# Patient Record
Sex: MALE | Race: BLACK OR AFRICAN AMERICAN | NOT HISPANIC OR LATINO | Employment: FULL TIME | URBAN - METROPOLITAN AREA
[De-identification: names, ages, dates, MRNs, and addresses within clinical notes are randomized per-mention and may not be internally consistent; named-entity substitution may affect disease eponyms.]

---

## 2017-08-11 ENCOUNTER — OFFICE VISIT (OUTPATIENT)
Dept: GASTROENTEROLOGY | Facility: CLINIC | Age: 47
End: 2017-08-11
Payer: COMMERCIAL

## 2017-08-11 ENCOUNTER — TELEPHONE (OUTPATIENT)
Dept: ENDOSCOPY | Facility: HOSPITAL | Age: 47
End: 2017-08-11

## 2017-08-11 VITALS — WEIGHT: 227.31 LBS | HEIGHT: 65 IN | BODY MASS INDEX: 37.87 KG/M2

## 2017-08-11 DIAGNOSIS — Z12.11 SPECIAL SCREENING FOR MALIGNANT NEOPLASMS, COLON: Primary | ICD-10-CM

## 2017-08-11 DIAGNOSIS — Z12.11 COLON CANCER SCREENING: ICD-10-CM

## 2017-08-11 DIAGNOSIS — R19.05 PERIUMBILICAL MASS: Primary | ICD-10-CM

## 2017-08-11 PROCEDURE — 99204 OFFICE O/P NEW MOD 45 MIN: CPT | Mod: S$GLB,,, | Performed by: NURSE PRACTITIONER

## 2017-08-11 PROCEDURE — 3008F BODY MASS INDEX DOCD: CPT | Mod: S$GLB,,, | Performed by: NURSE PRACTITIONER

## 2017-08-11 PROCEDURE — 99999 PR PBB SHADOW E&M-EST. PATIENT-LVL III: CPT | Mod: PBBFAC,,, | Performed by: NURSE PRACTITIONER

## 2017-08-11 RX ORDER — POLYETHYLENE GLYCOL 3350, SODIUM SULFATE ANHYDROUS, SODIUM BICARBONATE, SODIUM CHLORIDE, POTASSIUM CHLORIDE 236; 22.74; 6.74; 5.86; 2.97 G/4L; G/4L; G/4L; G/4L; G/4L
4 POWDER, FOR SOLUTION ORAL ONCE
Qty: 4000 ML | Refills: 0 | Status: SHIPPED | OUTPATIENT
Start: 2017-08-11 | End: 2017-08-11

## 2017-08-11 NOTE — PROGRESS NOTES
" [facility] Note    Reason for Visit:  The primary encounter diagnosis was Periumbilical mass- above umbilicus. A diagnosis of Colon cancer screening was also pertinent to this visit.    PCP:   Primary Doctor No       Referring MD:  Tomasz Self  No address on file    HPI:  This is a 46 y.o. male here for evaluation of abdominal mass. Mr. Hooks is new to the clinic.     Pt reports picked up 150 pound child and noticied "knot" above umbilicus a couple weeks ago. When first occurred was painful when pressed on upper abdomen and had nausea. Abdominal pain and nausea subsided shortly after. Denies vomiting. The mass will appear upon changing positions. Denies abdominal pain, discomfort, dullness, or pressure.    Denies hx of reflux, dysphagia/odynophagia.     Has a normal formed stool daily. Denies blood in stool and black tarry. NSAID usage- occasional use.     ROS:  Constitutional: No fevers, no chills, No unintentional weight loss, no fatigue   ENT: No allergies  CV: No chest pain, no palpitations, no perif. edema  Pulm: No cough, No shortness of breath, no wheezes, no sputum  Ophtho: No vision changes  GI: see HPI; also no nausea, no vomiting, no change in appetite.   Derm: No rash  Heme: No lymphadenopathy, No bruising  MSK: No arthritis, no muscle pain, no muscle weakness  : No dysuria, No hematuria  Endo: No hot or cold intolerance  Neuro: No syncope, No seizure     Medical History:  has no past medical history on file.    Surgical History:  has a past surgical history that includes Knee arthroscopy (Bilateral).    Family History: family history is not on file..     Social History:  reports that he has never smoked. He has never used smokeless tobacco. He reports that he drinks alcohol. He reports that he does not use drugs.    Review of patient's allergies indicates:  No Known Allergies    Current Outpatient Prescriptions   Medication Sig    polyethylene glycol " "(REN,KARRIE) 236-22.74-6.74 -5.86 gram suspension Take 4,000 mLs (4 L total) by mouth once. Pt waiting     No current facility-administered medications for this visit.      Objective Findings:    Vital Signs:  Ht 5' 5" (1.651 m)   Wt 103.1 kg (227 lb 4.7 oz)   BMI 37.82 kg/m²   Body mass index is 37.82 kg/m².    Physical Exam:  General Appearance: Well appearing in no acute distress  Head: Normocephalic, without obvious abnormality  Eyes: No scleral icterus, EOMI  ENT: Neck supple, Lips, mucosa, and tongue normal; teeth and gums normal  Lungs: CTA bilaterally in anterior and posterior fields, no wheezes, no crackles.  Heart: Regular rate and rhythm, no murmurs heard  Abdomen: Soft, non tender, with positive bowel sounds in all four quadrants. Midline mass above the umbilicus present upon tightening of abdominal wall.   Extremities: No clubbing, cyanosis or edema  Skin: No rash to exposed areas  Neurologic: AAOx4    Labs:  No results found for: WBC, HGB, HCT, PLT, CHOL, TRIG, HDL, LDLDIRECT, ALT, AST, NA, K, CL, CREATININE, BUN, CO2, TSH, PSA, INR, GLUF, HGBA1C, MICROALBUR    Endoscopy:    EGD- none  Colonoscopy- none    Assessment:  1. Periumbilical mass- above umbilicus    2. Colon cancer screening      Recommendations:  1. Will get Abd CT to rule out hernia.   2. Schedule colonoscopy to rule out malignancies.     Follow up after colonoscopy.     Order summary:  Orders Placed This Encounter    CT Abdomen Pelvis W Wo Contrast    Case request GI: COLONOSCOPY     Thank you so much for allowing me to participate in the care of [facility]    Hoa Brito, APRN, FNP-C    "

## 2017-09-01 ENCOUNTER — ANESTHESIA (OUTPATIENT)
Dept: ENDOSCOPY | Facility: HOSPITAL | Age: 47
End: 2017-09-01
Payer: COMMERCIAL

## 2017-09-01 ENCOUNTER — HOSPITAL ENCOUNTER (OUTPATIENT)
Facility: HOSPITAL | Age: 47
Discharge: HOME | End: 2017-09-01
Attending: INTERNAL MEDICINE | Admitting: INTERNAL MEDICINE
Payer: COMMERCIAL

## 2017-09-01 ENCOUNTER — ANESTHESIA EVENT (OUTPATIENT)
Dept: ENDOSCOPY | Facility: HOSPITAL | Age: 47
End: 2017-09-01
Payer: COMMERCIAL

## 2017-09-01 ENCOUNTER — SURGERY (OUTPATIENT)
Age: 47
End: 2017-09-01

## 2017-09-01 VITALS
HEART RATE: 72 BPM | SYSTOLIC BLOOD PRESSURE: 111 MMHG | HEIGHT: 65 IN | DIASTOLIC BLOOD PRESSURE: 71 MMHG | WEIGHT: 220 LBS | TEMPERATURE: 98 F | BODY MASS INDEX: 36.65 KG/M2 | OXYGEN SATURATION: 97 % | RESPIRATION RATE: 18 BRPM

## 2017-09-01 DIAGNOSIS — Z12.11 SCREENING FOR COLON CANCER: ICD-10-CM

## 2017-09-01 DIAGNOSIS — Z12.11 COLON CANCER SCREENING: Primary | ICD-10-CM

## 2017-09-01 PROCEDURE — 37000008 HC ANESTHESIA 1ST 15 MINUTES: Performed by: INTERNAL MEDICINE

## 2017-09-01 PROCEDURE — G0121 COLON CA SCRN NOT HI RSK IND: HCPCS | Mod: ,,, | Performed by: INTERNAL MEDICINE

## 2017-09-01 PROCEDURE — 25000003 PHARM REV CODE 250: Performed by: INTERNAL MEDICINE

## 2017-09-01 PROCEDURE — 63600175 PHARM REV CODE 636 W HCPCS: Performed by: NURSE ANESTHETIST, CERTIFIED REGISTERED

## 2017-09-01 PROCEDURE — G0121 COLON CA SCRN NOT HI RSK IND: HCPCS | Performed by: INTERNAL MEDICINE

## 2017-09-01 PROCEDURE — D9220A PRA ANESTHESIA: Mod: 33,CRNA,, | Performed by: NURSE ANESTHETIST, CERTIFIED REGISTERED

## 2017-09-01 PROCEDURE — D9220A PRA ANESTHESIA: Mod: 33,ANES,, | Performed by: ANESTHESIOLOGY

## 2017-09-01 PROCEDURE — 37000009 HC ANESTHESIA EA ADD 15 MINS: Performed by: INTERNAL MEDICINE

## 2017-09-01 RX ORDER — PROPOFOL 10 MG/ML
VIAL (ML) INTRAVENOUS CONTINUOUS PRN
Status: DISCONTINUED | OUTPATIENT
Start: 2017-09-01 | End: 2017-09-01

## 2017-09-01 RX ORDER — SODIUM CHLORIDE 9 MG/ML
INJECTION, SOLUTION INTRAVENOUS CONTINUOUS
Status: DISCONTINUED | OUTPATIENT
Start: 2017-09-01 | End: 2017-09-01 | Stop reason: HOSPADM

## 2017-09-01 RX ORDER — LIDOCAINE HCL/PF 100 MG/5ML
SYRINGE (ML) INTRAVENOUS
Status: DISCONTINUED | OUTPATIENT
Start: 2017-09-01 | End: 2017-09-01

## 2017-09-01 RX ADMIN — SODIUM CHLORIDE: 0.9 INJECTION, SOLUTION INTRAVENOUS at 10:09

## 2017-09-01 RX ADMIN — LIDOCAINE HYDROCHLORIDE 100 MG: 20 INJECTION, SOLUTION INTRAVENOUS at 10:09

## 2017-09-01 RX ADMIN — PROPOFOL 150 MCG/KG/MIN: 10 INJECTION, EMULSION INTRAVENOUS at 10:09

## 2017-09-01 NOTE — PATIENT INSTRUCTIONS
Discharge Summary/Instructions after an Endoscopic Procedure  Patient Name: Don Hooks  Patient MRN: [id number]  Patient YOB: 1970 Friday, September 01, 2017  Raymon Haq MD  RESTRICTIONS:  During your procedure today, you received medications for sedation.  These   medications may affect your judgment, balance and coordination.  Therefore,   for 24 hours, you have the following restrictions:   - DO NOT drive a car, operate machinery, make legal/financial decisions,   sign important papers or drink alcohol.    ACTIVITY:  The following day: return to full activity including work, except no heavy   lifting, straining or running for 3 days if polyps were removed.  DIET:  Eat and drink normally unless instructed otherwise.  TREATMENT FOR COMMON SIDE EFFECTS:  - Mild abdominal pain, belching, bloating or excessive gas: rest, eat   lightly and use a heating pad.  - Sore Throat: treat with throat lozenges and/or gargle with warm salt   water.  SYMPTOMS TO WATCH FOR AND REPORT TO YOUR PHYSICIAN:  1. Abdominal pain or bloating, other than gas cramps.  2. Chest pain.  3. Back pain.  4. Chills or fever occurring within 24 hours after the procedure.  5. Rectal bleeding, which would show as bright red, maroon, or black stools.   (A tablespoon of blood from the rectum is not serious, especially if   hemorrhoids are present.)  6. Vomiting.  7. Weakness or dizziness.  8. Because air was used during the procedure, expelling large amounts of air   from your rectum or belching is normal.  9. If a bowel prep was taken, you may not have a bowel movement for 1-3   days.  This is normal.  GO DIRECTLY TO THE EMERGENCY ROOM IF YOU HAVE ANY OF THE FOLLOWING:   Difficulty breathing   Chills and/or fever over 101 F   Persistent vomiting and/or vomiting blood   Severe abdominal pain   Severe chest pain   Black, tarry stools   Bleeding- more than one tablespoon  Your doctor recommends these additional instructions:  If  any biopsies were taken, your doctors clinic will call you in 1 to 2   weeks with any results.  You have a contact number available for emergencies.  The signs and symptoms   of potential delayed complications were discussed with you.  You may return   to normal activities tomorrow.  Written discharge instructions were   provided to you.   You are being discharged to home.   Resume your previous diet.   Continue your present medications.   Your physician has recommended a repeat colonoscopy in 10 years for   screening purposes.  For questions, problems or results please call your physician - Raymon Haq MD at Work:  [phone].  [facility], EMERGENCY ROOM PHONE NUMBER: [phone]  IF A COMPLICATION OR EMERGENCY SITUATION ARISES AND YOU ARE UNABLE TO REACH   YOUR PHYSICIAN - GO DIRECTLY TO THE EMERGENCY ROOM.  Raymon Haq MD  9/1/2017 11:23:21 AM  This report has been verified and signed electronically.

## 2017-09-01 NOTE — INTERVAL H&P NOTE
The patient has been examined and the H&P has been reviewed:    There is no interval changes since last encounter.    Colonoscopy: Screening for CRC  Sedation: GA  ASA: Per anesthesia  Mallampati: Per anesthesia    Endoscopy risks, benefits and alternative options discussed and understood by patient/family.          Active Hospital Problems    Diagnosis  POA    Screening for colon cancer [Z12.11]  Not Applicable      Resolved Hospital Problems    Diagnosis Date Resolved POA   No resolved problems to display.

## 2017-09-01 NOTE — H&P (VIEW-ONLY)
" [facility] Note    Reason for Visit:  The primary encounter diagnosis was Periumbilical mass- above umbilicus. A diagnosis of Colon cancer screening was also pertinent to this visit.    PCP:   Primary Doctor No       Referring MD:  Tomasz Self  No address on file    HPI:  This is a 46 y.o. male here for evaluation of abdominal mass. Mr. Hooks is new to the clinic.     Pt reports picked up 150 pound child and noticied "knot" above umbilicus a couple weeks ago. When first occurred was painful when pressed on upper abdomen and had nausea. Abdominal pain and nausea subsided shortly after. Denies vomiting. The mass will appear upon changing positions. Denies abdominal pain, discomfort, dullness, or pressure.    Denies hx of reflux, dysphagia/odynophagia.     Has a normal formed stool daily. Denies blood in stool and black tarry. NSAID usage- occasional use.     ROS:  Constitutional: No fevers, no chills, No unintentional weight loss, no fatigue   ENT: No allergies  CV: No chest pain, no palpitations, no perif. edema  Pulm: No cough, No shortness of breath, no wheezes, no sputum  Ophtho: No vision changes  GI: see HPI; also no nausea, no vomiting, no change in appetite.   Derm: No rash  Heme: No lymphadenopathy, No bruising  MSK: No arthritis, no muscle pain, no muscle weakness  : No dysuria, No hematuria  Endo: No hot or cold intolerance  Neuro: No syncope, No seizure     Medical History:  has no past medical history on file.    Surgical History:  has a past surgical history that includes Knee arthroscopy (Bilateral).    Family History: family history is not on file..     Social History:  reports that he has never smoked. He has never used smokeless tobacco. He reports that he drinks alcohol. He reports that he does not use drugs.    Review of patient's allergies indicates:  No Known Allergies    Current Outpatient Prescriptions   Medication Sig    polyethylene glycol " "(REN,KARRIE) 236-22.74-6.74 -5.86 gram suspension Take 4,000 mLs (4 L total) by mouth once. Pt waiting     No current facility-administered medications for this visit.      Objective Findings:    Vital Signs:  Ht 5' 5" (1.651 m)   Wt 103.1 kg (227 lb 4.7 oz)   BMI 37.82 kg/m²   Body mass index is 37.82 kg/m².    Physical Exam:  General Appearance: Well appearing in no acute distress  Head: Normocephalic, without obvious abnormality  Eyes: No scleral icterus, EOMI  ENT: Neck supple, Lips, mucosa, and tongue normal; teeth and gums normal  Lungs: CTA bilaterally in anterior and posterior fields, no wheezes, no crackles.  Heart: Regular rate and rhythm, no murmurs heard  Abdomen: Soft, non tender, with positive bowel sounds in all four quadrants. Midline mass above the umbilicus present upon tightening of abdominal wall.   Extremities: No clubbing, cyanosis or edema  Skin: No rash to exposed areas  Neurologic: AAOx4    Labs:  No results found for: WBC, HGB, HCT, PLT, CHOL, TRIG, HDL, LDLDIRECT, ALT, AST, NA, K, CL, CREATININE, BUN, CO2, TSH, PSA, INR, GLUF, HGBA1C, MICROALBUR    Endoscopy:    EGD- none  Colonoscopy- none    Assessment:  1. Periumbilical mass- above umbilicus    2. Colon cancer screening      Recommendations:  1. Will get Abd CT to rule out hernia.   2. Schedule colonoscopy to rule out malignancies.     Follow up after colonoscopy.     Order summary:  Orders Placed This Encounter    CT Abdomen Pelvis W Wo Contrast    Case request GI: COLONOSCOPY     Thank you so much for allowing me to participate in the care of [facility]    Hoa Brito, APRN, FNP-C    "

## 2017-09-01 NOTE — ANESTHESIA POSTPROCEDURE EVALUATION
"Anesthesia Post Evaluation    Patient: Don Hooks    Procedure(s) Performed: Procedure(s) (LRB):  COLONOSCOPY (N/A)    Final Anesthesia Type: general  Patient location during evaluation: PACU  Patient participation: Yes- Able to Participate  Level of consciousness: awake and alert  Post-procedure vital signs: reviewed and stable  Pain management: adequate  Airway patency: patent  PONV status at discharge: No PONV  Anesthetic complications: no      Cardiovascular status: blood pressure returned to baseline  Respiratory status: unassisted  Hydration status: euvolemic  Follow-up not needed.        Visit Vitals  /71 (BP Location: Left arm, Patient Position: Lying)   Pulse 72   Temp 36.8 °C (98.2 °F) (Temporal)   Resp 18   Ht 5' 5" (1.651 m)   Wt 99.8 kg (220 lb)   SpO2 97%   BMI 36.61 kg/m²       Pain/Chivo Score: Pain Assessment Performed: Yes (9/1/2017 11:47 AM)  Presence of Pain: denies (9/1/2017 11:47 AM)  Chivo Score: 9 (9/1/2017 11:37 AM)      "

## 2017-09-01 NOTE — TRANSFER OF CARE
"Anesthesia Transfer of Care Note    Patient: Don Hooks    Procedure(s) Performed: Procedure(s) (LRB):  COLONOSCOPY (N/A)    Patient location: [facility]    Anesthesia Type: general    Transport from OR: Transported from OR on 2-3 L/min O2 by NC with adequate spontaneous ventilation    Post pain: adequate analgesia    Post assessment: no apparent anesthetic complications    Post vital signs: stable    Level of consciousness: awake    Nausea/Vomiting: no nausea/vomiting    Complications: none    Transfer of care protocol was followed      Last vitals:   Visit Vitals  BP (!) 144/78 (Patient Position: Lying)   Pulse 74   Temp 37 °C (98.6 °F) (Temporal)   Resp 16   Ht 5' 5" (1.651 m)   Wt 99.8 kg (220 lb)   SpO2 97%   BMI 36.61 kg/m²     "

## 2017-09-01 NOTE — ANESTHESIA PREPROCEDURE EVALUATION
09/01/2017  Don Hooks is a 46 y.o., male.    Anesthesia Evaluation    I have reviewed the Patient Summary Reports.     I have reviewed the Medications.     Review of Systems  Anesthesia Hx:  No problems with previous Anesthesia  History of prior surgery of interest to airway management or planning: Previous anesthesia: General   Social:  Social Alcohol Use, Non-Smoker    Hematology/Oncology:  Hematology Normal   Oncology Normal     EENT/Dental:EENT/Dental Normal   Cardiovascular:  Cardiovascular Normal Exercise tolerance: good     Pulmonary:  Pulmonary Normal    Renal/:  Renal/ Normal     Hepatic/GI:  Hepatic/GI Normal    Musculoskeletal:  Musculoskeletal Normal    Neurological:  Neurology Normal    Endocrine:  Endocrine Normal    Dermatological:  Skin Normal    Psych:  Psychiatric Normal           Physical Exam  General:  Obesity    Airway/Jaw/Neck:  Airway Findings: Mouth Opening: Normal Tongue: Normal  General Airway Assessment: Adult  Mallampati: II  TM Distance: Normal, at least 6 cm  Jaw/Neck Findings:  Neck ROM: Normal ROM      Dental:  Dental Findings: In tact        Mental Status:  Mental Status Findings:  Cooperative, Alert and Oriented         Anesthesia Plan  Type of Anesthesia, risks & benefits discussed:  Anesthesia Type:  general  Patient's Preference: GA  Intra-op Monitoring Plan: standard ASA monitors  Intra-op Monitoring Plan Comments:   Post Op Pain Control Plan:   Post Op Pain Control Plan Comments:   Induction:   IV  Beta Blocker:  Patient is not currently on a Beta-Blocker (No further documentation required).       Informed Consent: Patient understands risks and agrees with Anesthesia plan.  Questions answered. Anesthesia consent signed with patient.  ASA Score: 2     Day of Surgery Review of History & Physical:    H&P update referred to the provider.         Ready For  Surgery From Anesthesia Perspective.

## 2017-09-08 ENCOUNTER — TELEPHONE (OUTPATIENT)
Dept: ENDOSCOPY | Facility: HOSPITAL | Age: 47
End: 2017-09-08

## 2023-02-24 ENCOUNTER — HOSPITAL ENCOUNTER (OUTPATIENT)
Dept: RADIOLOGY | Facility: HOSPITAL | Age: 53
Discharge: HOME | End: 2023-02-24
Attending: PHYSICIAN ASSISTANT
Payer: COMMERCIAL

## 2023-02-24 ENCOUNTER — OFFICE VISIT (OUTPATIENT)
Dept: SPORTS MEDICINE | Facility: CLINIC | Age: 53
End: 2023-02-24
Payer: COMMERCIAL

## 2023-02-24 VITALS
HEIGHT: 65 IN | BODY MASS INDEX: 36.65 KG/M2 | DIASTOLIC BLOOD PRESSURE: 83 MMHG | WEIGHT: 220 LBS | SYSTOLIC BLOOD PRESSURE: 151 MMHG | HEART RATE: 87 BPM

## 2023-02-24 DIAGNOSIS — G89.29 CHRONIC PAIN OF BOTH KNEES: ICD-10-CM

## 2023-02-24 DIAGNOSIS — M25.561 CHRONIC PAIN OF BOTH KNEES: ICD-10-CM

## 2023-02-24 DIAGNOSIS — M25.562 CHRONIC PAIN OF BOTH KNEES: ICD-10-CM

## 2023-02-24 DIAGNOSIS — M17.0 PRIMARY OSTEOARTHRITIS OF BOTH KNEES: Primary | ICD-10-CM

## 2023-02-24 PROCEDURE — 3077F SYST BP >= 140 MM HG: CPT | Mod: CPTII,S$GLB,, | Performed by: PHYSICIAN ASSISTANT

## 2023-02-24 PROCEDURE — 20610 PR DRAIN/INJECT LARGE JOINT/BURSA: ICD-10-PCS | Mod: 50,S$GLB,, | Performed by: PHYSICIAN ASSISTANT

## 2023-02-24 PROCEDURE — 1160F PR REVIEW ALL MEDS BY PRESCRIBER/CLIN PHARMACIST DOCUMENTED: ICD-10-PCS | Mod: CPTII,S$GLB,, | Performed by: PHYSICIAN ASSISTANT

## 2023-02-24 PROCEDURE — 20610 DRAIN/INJ JOINT/BURSA W/O US: CPT | Mod: 50,S$GLB,, | Performed by: PHYSICIAN ASSISTANT

## 2023-02-24 PROCEDURE — 3008F PR BODY MASS INDEX (BMI) DOCUMENTED: ICD-10-PCS | Mod: CPTII,S$GLB,, | Performed by: PHYSICIAN ASSISTANT

## 2023-02-24 PROCEDURE — 3079F PR MOST RECENT DIASTOLIC BLOOD PRESSURE 80-89 MM HG: ICD-10-PCS | Mod: CPTII,S$GLB,, | Performed by: PHYSICIAN ASSISTANT

## 2023-02-24 PROCEDURE — 1160F RVW MEDS BY RX/DR IN RCRD: CPT | Mod: CPTII,S$GLB,, | Performed by: PHYSICIAN ASSISTANT

## 2023-02-24 PROCEDURE — 3079F DIAST BP 80-89 MM HG: CPT | Mod: CPTII,S$GLB,, | Performed by: PHYSICIAN ASSISTANT

## 2023-02-24 PROCEDURE — 3008F BODY MASS INDEX DOCD: CPT | Mod: CPTII,S$GLB,, | Performed by: PHYSICIAN ASSISTANT

## 2023-02-24 PROCEDURE — 73564 X-RAY EXAM KNEE 4 OR MORE: CPT | Mod: 26,50,, | Performed by: RADIOLOGY

## 2023-02-24 PROCEDURE — 1159F MED LIST DOCD IN RCRD: CPT | Mod: CPTII,S$GLB,, | Performed by: PHYSICIAN ASSISTANT

## 2023-02-24 PROCEDURE — 3077F PR MOST RECENT SYSTOLIC BLOOD PRESSURE >= 140 MM HG: ICD-10-PCS | Mod: CPTII,S$GLB,, | Performed by: PHYSICIAN ASSISTANT

## 2023-02-24 PROCEDURE — 99999 PR PBB SHADOW E&M-NEW PATIENT-LVL III: ICD-10-PCS | Mod: PBBFAC,,, | Performed by: PHYSICIAN ASSISTANT

## 2023-02-24 PROCEDURE — 99204 OFFICE O/P NEW MOD 45 MIN: CPT | Mod: 25,S$GLB,, | Performed by: PHYSICIAN ASSISTANT

## 2023-02-24 PROCEDURE — 99999 PR PBB SHADOW E&M-NEW PATIENT-LVL III: CPT | Mod: PBBFAC,,, | Performed by: PHYSICIAN ASSISTANT

## 2023-02-24 PROCEDURE — 99204 PR OFFICE/OUTPT VISIT, NEW, LEVL IV, 45-59 MIN: ICD-10-PCS | Mod: 25,S$GLB,, | Performed by: PHYSICIAN ASSISTANT

## 2023-02-24 PROCEDURE — 1159F PR MEDICATION LIST DOCUMENTED IN MEDICAL RECORD: ICD-10-PCS | Mod: CPTII,S$GLB,, | Performed by: PHYSICIAN ASSISTANT

## 2023-02-24 PROCEDURE — 73564 X-RAY EXAM KNEE 4 OR MORE: CPT | Mod: TC,50

## 2023-02-24 PROCEDURE — 73564 XR KNEE ORTHO BILAT WITH FLEXION: ICD-10-PCS | Mod: 26,50,, | Performed by: RADIOLOGY

## 2023-02-24 RX ORDER — TRIAMCINOLONE ACETONIDE 40 MG/ML
40 INJECTION, SUSPENSION INTRA-ARTICULAR; INTRAMUSCULAR
Status: COMPLETED | OUTPATIENT
Start: 2023-02-24 | End: 2023-02-24

## 2023-02-24 RX ORDER — MELOXICAM 15 MG/1
15 TABLET ORAL DAILY
Qty: 30 TABLET | Refills: 2 | Status: SHIPPED | OUTPATIENT
Start: 2023-02-24

## 2023-02-24 RX ADMIN — TRIAMCINOLONE ACETONIDE 40 MG: 40 INJECTION, SUSPENSION INTRA-ARTICULAR; INTRAMUSCULAR at 02:02

## 2023-02-24 NOTE — PROGRESS NOTES
Subjective:          Chief Complaint: Don Hooks is a 52 y.o. male who had concerns including Pain of the Right Knee and Pain of the Left Knee.    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 The gradual pain started 20+ years ago with no clear LOURDES and is becoming progressively worse last few months. Pain is located over (points to) medial/lateral joint line and behind the patella bilaterally. He reports that the pain is a 6 /10 aching and throbbing pain today and not responding adequately to conservative measures which have included activity modifications, rest, and oral medication. Is affecting ADLs and limiting desired level of activity. Denies numbness, tingling, radiation in toes, and inability to bear weight.  Pain is 7 /10 at its worst    Mechanical symptoms: none  Subjective instability: (--)   Worse at the end of the day and with increased activity, sit to stand  Better with rest.   Nocturnal symptoms: (+)    No previous trauma on knees    Bilateral knee surgeries 1980s, possible mensicus          Review of Systems   Constitutional: Negative for chills and fever.   HENT:  Negative for congestion and sore throat.    Eyes:  Negative for discharge and double vision.   Cardiovascular:  Negative for chest pain, palpitations and syncope.   Respiratory:  Negative for cough and shortness of breath.    Endocrine: Negative for cold intolerance and heat intolerance.   Skin:  Negative for dry skin and rash.   Musculoskeletal:  Positive for arthritis, joint pain and joint swelling.   Gastrointestinal:  Negative for abdominal pain, nausea and vomiting.   Neurological:  Negative for focal weakness, numbness and paresthesias.                 Objective:        General: Don is well-developed, well-nourished, appears stated age, in no acute distress, alert and oriented to time, place and person.     General    Nursing note and vitals reviewed.  Constitutional: He is  oriented to person, place, and time. He appears well-developed and well-nourished. No distress.   Eyes: Conjunctivae and EOM are normal. Pupils are equal, round, and reactive to light.   Neck: Neck supple. No JVD present.   Cardiovascular:  Normal heart sounds and intact distal pulses.            No murmur heard.  Pulmonary/Chest: Effort normal and breath sounds normal. No respiratory distress.   Abdominal: Soft. Bowel sounds are normal. He exhibits no distension. There is no abdominal tenderness.   Neurological: He is alert and oriented to person, place, and time. Coordination normal.   Psychiatric: He has a normal mood and affect. His behavior is normal. Judgment and thought content normal.     General Musculoskeletal Exam   Gait: normal       Right Knee Exam     Inspection   Erythema: absent  Scars: absent  Swelling: absent  Effusion: absent  Deformity: absent  Bruising: absent    Tenderness   The patient is tender to palpation of the medial joint line and lateral joint line.    Range of Motion   Extension:  0 (3) abnormal   Flexion:  100 abnormal     Tests   Meniscus   Claudio:  Medial - negative Lateral - negative  Ligament Examination   Lachman: normal (-1 to 2mm)   PCL-Posterior Drawer: normal (0 to 2mm)     MCL - Valgus: normal (0 to 2mm)  LCL - Varus: normal  Dial Test at 90 degrees: normal (< 5 degrees)  Posterolateral Corner: stable  Patella   Patellar Glide (quadrants): Lateral - 1   Medial - 2  Patellar Grind: positive    Other   Popliteal (Baker's) Cyst: absent  Sensation: normal    Left Knee Exam     Inspection   Erythema: absent  Scars: absent  Swelling: absent  Effusion: absent  Deformity: absent  Bruising: absent    Tenderness   The patient tender to palpation of the medial joint line and patella.    Range of Motion   Extension:  0 (3) abnormal   Flexion:  100 abnormal     Tests   Meniscus   Claudio:  Medial - negative Lateral - negative  Stability   Lachman: normal (-1 to 2mm)   PCL-Posterior  Drawer: normal (0 to 2mm)  MCL - Valgus: normal (0 to 2mm)  LCL - Varus: normal (0 to 2mm)  Dial Test at 90 degrees: normal (< 5 degrees)  Posterolateral Corner: stable  Patella   Patellar Glide (Quadrants): Lateral - 1 Medial - 2  Patellar Grind: positive    Other   Popliteal (Baker's) Cyst: absent  Sensation: normal    Right Hip Exam     Tests   Angelito: negative  Left Hip Exam     Tests   Angelito: negative          Muscle Strength   Right Lower Extremity   Hip Abduction: 5/5   Quadriceps:  4/5   Hamstrin/5   Left Lower Extremity   Hip Abduction: 5/5   Quadriceps:  4/5   Hamstrin/5     Vascular Exam     Right Pulses  Dorsalis Pedis:      2+  Posterior Tibial:      2+        Left Pulses  Dorsalis Pedis:      2+  Posterior Tibial:      2+        Edema  Right Lower Leg: absent  Left Lower Leg: absent    Radiographic findings today:    DJD with narrowing of the patellofemoral and the tibiofemoral joint spaces slightly more marked the left side.  Ununited ossicle adjacent to the left tibial tuberosity noted.  No acute fracture or dislocation.  No bone destruction identified.    Kellgren-Simba Classification: 3    Xrays of the knees were ordered and reviewed by me today. These findings were discussed and reviewed with the patient.        Assessment:       Encounter Diagnoses   Name Primary?    Primary osteoarthritis of both knees Yes    Chronic pain of both knees           Plan:       We have discussed a variety of treatment options including medications, injections, physical therapy and other alternative treatments. Pt is requesting CSI and physical therapy at this time.    I made the decision to obtain old records of the patient including previous notes and imaging. I independently reviewed and interpreted lab results today as well as prior imaging.     1. Injection Procedure  A time out was performed, including verification of patient ID, procedure, site and side, availability of information and equipment,  review of safety issues, and agreement with consent, the procedure site was marked.    After time out was performed, the patient was prepped aseptically with chloraprep swabsticks. A diagnostic and therapeutic injection of 1:4cc Kenalog/Marcaine was given under sterile technique using a 22g x 1.5 needle from the Superolateral  aspect of the bilateral Knee Joint in the supine position.      Don Hooks had no adverse reactions to the medication. Pain decreased. He was instructed to apply ice to the joint for 20 minutes and avoid strenuous activities for 24-36 hours following the injection. He was warned of possible blood sugar and/or blood pressure changes during that time. Following that time, he can resume regular activities.    He was reminded to call the clinic immediately for any adverse side effects as explained in clinic today.    2. Mobic 15 mg 1 time daily PRN for pain management. Patient understands to take with food and/or OTC prilosec to decrease GI side effects.  3. Ambulatory referral to physical therapy for patellofemoral strength and conditioning protocol  4. Ice compress to the affected area 2-3x a day for 15-20 minutes as needed for pain management.  5. Discussed the possibility of viscosupplementation if pain is refractory to CSI.  6. RTC to see Trae Maradiaga PA-C as needed for follow-up.    All of the patient's questions were answered and the patient will contact us if they have any questions or concerns in the interim.              Patient questionnaires may have been collected.

## 2025-07-21 ENCOUNTER — HOSPITAL ENCOUNTER (OUTPATIENT)
Dept: RADIOLOGY | Facility: HOSPITAL | Age: 55
Discharge: HOME | End: 2025-07-21
Attending: PHYSICIAN ASSISTANT
Payer: COMMERCIAL

## 2025-07-21 ENCOUNTER — OFFICE VISIT (OUTPATIENT)
Dept: SPORTS MEDICINE | Facility: CLINIC | Age: 55
End: 2025-07-21
Payer: COMMERCIAL

## 2025-07-21 VITALS
BODY MASS INDEX: 38.89 KG/M2 | SYSTOLIC BLOOD PRESSURE: 169 MMHG | WEIGHT: 233.69 LBS | DIASTOLIC BLOOD PRESSURE: 89 MMHG | HEART RATE: 101 BPM

## 2025-07-21 DIAGNOSIS — M25.512 LEFT SHOULDER PAIN, UNSPECIFIED CHRONICITY: ICD-10-CM

## 2025-07-21 DIAGNOSIS — M75.102 ROTATOR CUFF SYNDROME OF LEFT SHOULDER: Primary | ICD-10-CM

## 2025-07-21 PROCEDURE — 3077F SYST BP >= 140 MM HG: CPT | Mod: CPTII,S$GLB,, | Performed by: PHYSICIAN ASSISTANT

## 2025-07-21 PROCEDURE — 3079F DIAST BP 80-89 MM HG: CPT | Mod: CPTII,S$GLB,, | Performed by: PHYSICIAN ASSISTANT

## 2025-07-21 PROCEDURE — 1159F MED LIST DOCD IN RCRD: CPT | Mod: CPTII,S$GLB,, | Performed by: PHYSICIAN ASSISTANT

## 2025-07-21 PROCEDURE — 99214 OFFICE O/P EST MOD 30 MIN: CPT | Mod: S$GLB,,, | Performed by: PHYSICIAN ASSISTANT

## 2025-07-21 PROCEDURE — 99999 PR PBB SHADOW E&M-EST. PATIENT-LVL III: CPT | Mod: PBBFAC,,, | Performed by: PHYSICIAN ASSISTANT

## 2025-07-21 PROCEDURE — 3008F BODY MASS INDEX DOCD: CPT | Mod: CPTII,S$GLB,, | Performed by: PHYSICIAN ASSISTANT

## 2025-07-21 PROCEDURE — 73030 X-RAY EXAM OF SHOULDER: CPT | Mod: 26,LT,, | Performed by: RADIOLOGY

## 2025-07-21 PROCEDURE — 1160F RVW MEDS BY RX/DR IN RCRD: CPT | Mod: CPTII,S$GLB,, | Performed by: PHYSICIAN ASSISTANT

## 2025-07-21 PROCEDURE — 73030 X-RAY EXAM OF SHOULDER: CPT | Mod: TC,LT

## 2025-07-21 RX ORDER — METHOCARBAMOL 500 MG/1
500 TABLET, FILM COATED ORAL 3 TIMES DAILY PRN
Qty: 40 TABLET | Refills: 0 | Status: SHIPPED | OUTPATIENT
Start: 2025-07-21

## 2025-07-21 RX ORDER — MELOXICAM 15 MG/1
15 TABLET ORAL DAILY
Qty: 30 TABLET | Refills: 0 | Status: SHIPPED | OUTPATIENT
Start: 2025-07-21

## 2025-07-21 NOTE — PROGRESS NOTES
Subjective:     Chief Complaint: Don Hooks is a 54 y.o. male who had concerns including Pain of the Left Shoulder.    History of Present Illness    CHIEF COMPLAINT:  - Left shoulder pain and weakness    HPI:  Don presents with shoulder pain that has been ongoing for months but has recently become more intense and frequent. Pain is described as deep and is associated with weakness in certain positions, particularly when lifting objects. He reports difficulty lifting a 10-pound dumbbell. Pain is exacerbated by various shoulder movements, including reaching behind his back and cross-body motions. 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    His symptoms have significantly impacted his daily activities and sleep. He states that it is difficult to sleep at night and he can no longer sleep on the affected shoulder. He denies any specific injuries related to the shoulder pain.    He has previously been prescribed Meloxicam for knee issues, which he reports worked well, but it is unclear if this has been used for his current shoulder pain.    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, requiring a stirring motion  - Experiences weakness and difficulty performing certain tasks, such as opening and closing valves      ROS:  General: negative fever, negative chills, negative fatigue, negative weight gain, negative weight loss  Eyes: negative vision changes, negative redness, negative discharge  ENT: negative ear pain, negative nasal congestion, negative sore throat  Cardiovascular: negative chest pain, negative palpitations, negative lower extremity  edema  Respiratory: negative cough, negative shortness of breath  Gastrointestinal: negative abdominal pain, negative nausea, negative vomiting, negative diarrhea, negative constipation, negative blood in stool  Genitourinary: negative dysuria, negative hematuria, negative frequency  Musculoskeletal: negative joint pain, negative muscle pain, +pain with movement, +muscle weakness, +nightime pain, +sleep disturbances  Skin: negative rash, negative lesion  Neurological: negative headache, negative dizziness, negative numbness, negative tingling  Psychiatric: negative anxiety, negative depression, negative sleep difficulty           Pain Related Questions  Over the past 3 days, what was your average pain during activity? (I.e. running, jogging, walking, climbing stairs, getting dressed, ect.): 9  Over the past 3 days, what was your highest pain level?: 10  Over the past 3 days, what was your lowest pain level? : 7    Other  How many nights a week are you awakened by your affected body part?: 0    Past Medical History[1]     Past Surgical History:   Procedure Laterality Date    COLONOSCOPY N/A 9/1/2017    Procedure: COLONOSCOPY;  Surgeon: Raymon Haq MD;  Location: [address];  Service: Endoscopy;  Laterality: N/A;    KNEE ARTHROSCOPY Bilateral     lateral minicus repair in 1988       Objective:     General: Don is well-developed, well-nourished, appears stated age, in no acute distress, alert and oriented to time, place and person.     General    Nursing note and vitals reviewed.  Constitutional: He is oriented to person, place, and time. He appears well-developed and well-nourished. No distress.   HENT:   Head: Normocephalic and atraumatic.   Nose: Nose normal.   Eyes: Conjunctivae and EOM are normal. Pupils are equal, round, and reactive to light.   Cardiovascular:  Normal rate, regular rhythm and intact distal pulses.            Pulmonary/Chest: Effort normal and breath sounds normal.   Abdominal:  Soft. Bowel sounds are normal. He exhibits no distension.   Neurological: He is alert and oriented to person, place, and time. He has normal reflexes.   Psychiatric: He has a normal mood and affect. His behavior is normal. Judgment and thought content normal.         Right Shoulder Exam     Inspection/Observation   Swelling: absent  Bruising: absent  Scars: absent  Deformity: absent  Scapular Winging: absent  Scapular Dyskinesia: negative  Atrophy: absent    Tenderness   The patient is experiencing no tenderness.    Range of Motion   Active abduction:  150   Passive abduction:  150   Extension:  50   Forward Flexion:  180   Forward Elevation: 180  Adduction: 30   External Rotation 0 degrees:  60   Internal rotation 0 degrees:  T6   Internal rotation 90 degrees:  90     Tests & Signs   Apprehension: negative  Cross arm: negative  Drop arm: negative  Guajardo test: negative  Impingement: negative  Lift Off Sign: negative  Belly Press: negative  Active Compression Test ([place]'s Sign): negative  Yergason's Test: negative  Speed's Test: negative  Relocation 90 degrees: negative  Jerk Test: negative    Other   Sensation: normal    Left Shoulder Exam     Inspection/Observation   Swelling: absent  Bruising: absent  Scars: absent  Deformity: absent  Scapular Winging: absent  Scapular Dyskinesia: negative  Atrophy: absent    Tenderness   The patient is tender to palpation of the biceps tendon, greater tuberosity and supraspinatus.    Range of Motion   Active abduction:  150   Passive abduction:  150   Extension:  50   Forward Flexion:  150   Forward Elevation: 150  Adduction: 30   External Rotation 0 degrees:  30 abnormal   Internal rotation 0 degrees:  T10 abnormal   Internal rotation 90 degrees:  90     Tests & Signs   Apprehension: negative  Cross arm: negative  Drop arm: negative  Guajardo test: positive  Impingement: positive  Rotator Cuff Painful Arc/Range: mild  Lift Off Sign: positive  Belly Press: negative  Active  Compression test ([place]'s Sign): negative  Yergasons's Test: negative  Speed's Test: positive  Relocation 90 degrees: negative  Jerk Test: negative    Other   Sensation: normal       Muscle Strength   Right Upper Extremity   Shoulder Abduction: 5/5   Shoulder Internal Rotation: 5/5   Shoulder External Rotation: 5/5   Supraspinatus: 5/5   Subscapularis: 5/5   Biceps: 5/5   Left Upper Extremity  Shoulder Abduction: 5/5   Shoulder Internal Rotation: 5/5   Shoulder External Rotation: 5/5   Supraspinatus: 4/5   Subscapularis: 5/5   Biceps: 4/5     Vascular Exam     Right Pulses      Radial:                    2+      Left Pulses      Radial:                    2+      Capillary Refill  Right Hand: normal capillary refill  Left Hand: normal capillary refill              Radiographic findings:    X-Ray Shoulder 2 or More Views Left  EXAMINATION:  XR SHOULDER COMPLETE 2 OR MORE VIEWS LEFT    CLINICAL HISTORY:  Pain in left shoulder    FINDINGS:  Shoulder complete three views left.    There is baseline DJD.  No fracture, dislocation, or bone destruction seen.    Electronically signed by: Bill Vergara MD  Date:    07/21/2025  Time:    14:33            These findings were discussed and reviewed with the patient.     Assessment:     Encounter Diagnoses   Name Primary?    Rotator cuff syndrome of left shoulder Yes    Left shoulder pain, unspecified chronicity         Plan:     Assessment & Plan    PROCEDURES:  1. Reviewed x-ray images of the shoulder with the patient, explaining findings such as impingement, bone spurring, arthritis in the AC joint, and an os acromiale.    PATIENT INSTRUCTIONS:  1. Perform home exercises as instructed by physical therapist.    MEDICATIONS:  1. Meloxicam (Mobic) for pain.  2. Robaxin up to 3 times daily, as needed, especially at night.    REFERRALS:  1. Referred to PT for strengthening exercises.    FOLLOW UP:  1. Follow up in 6-8 weeks to assess progress with PT.  2. Contact the office if  patient wants to consider injection.           All of the patient's questions were answered and the patient will contact us if they have any questions or concerns in the interim.    This note was generated with the assistance of ambient listening technology. Verbal consent was obtained by the patient and accompanying visitor(s) for the recording of patient appointment to facilitate this note. I attest to having reviewed and edited the generated note for accuracy, though some syntax or spelling errors may persist. Please contact the author of this note for any clarification.           [1] No past medical history on file.